# Patient Record
Sex: MALE | Race: BLACK OR AFRICAN AMERICAN
[De-identification: names, ages, dates, MRNs, and addresses within clinical notes are randomized per-mention and may not be internally consistent; named-entity substitution may affect disease eponyms.]

---

## 2020-10-04 VITALS
SYSTOLIC BLOOD PRESSURE: 136 MMHG | SYSTOLIC BLOOD PRESSURE: 136 MMHG | SYSTOLIC BLOOD PRESSURE: 136 MMHG | DIASTOLIC BLOOD PRESSURE: 73 MMHG | DIASTOLIC BLOOD PRESSURE: 73 MMHG | DIASTOLIC BLOOD PRESSURE: 73 MMHG

## 2021-12-08 ENCOUNTER — HOSPITAL ENCOUNTER (OUTPATIENT)
Dept: HOSPITAL 63 - LAB | Age: 76
End: 2021-12-08
Attending: UROLOGY
Payer: MEDICARE

## 2021-12-08 DIAGNOSIS — R30.0: Primary | ICD-10-CM

## 2021-12-08 LAB
APTT PPP: YELLOW S
BACTERIA #/AREA URNS HPF: (no result) /HPF
BILIRUB UR QL STRIP: (no result)
FIBRINOGEN PPP-MCNC: (no result) MG/DL
GLUCOSE UR STRIP-MCNC: (no result) MG/DL
NITRITE UR QL STRIP: (no result)
RBC #/AREA URNS HPF: (no result) /HPF (ref 0–2)
SP GR UR STRIP: >=1.03
SQUAMOUS #/AREA URNS LPF: (no result) /LPF
UROBILINOGEN UR-MCNC: 0.2 MG/DL
WBC #/AREA URNS HPF: >40 /HPF (ref 0–4)

## 2021-12-08 PROCEDURE — 87077 CULTURE AEROBIC IDENTIFY: CPT

## 2021-12-08 PROCEDURE — 87086 URINE CULTURE/COLONY COUNT: CPT

## 2021-12-08 PROCEDURE — 87186 SC STD MICRODIL/AGAR DIL: CPT

## 2021-12-08 PROCEDURE — 81001 URINALYSIS AUTO W/SCOPE: CPT

## 2022-01-28 ENCOUNTER — HOSPITAL ENCOUNTER (OUTPATIENT)
Dept: HOSPITAL 63 - CT | Age: 77
End: 2022-01-28
Attending: UROLOGY
Payer: MEDICARE

## 2022-01-28 DIAGNOSIS — J98.11: ICD-10-CM

## 2022-01-28 DIAGNOSIS — M43.16: ICD-10-CM

## 2022-01-28 DIAGNOSIS — N20.0: Primary | ICD-10-CM

## 2022-01-28 DIAGNOSIS — I25.10: ICD-10-CM

## 2022-01-28 DIAGNOSIS — N28.1: ICD-10-CM

## 2022-01-28 DIAGNOSIS — K44.9: ICD-10-CM

## 2022-01-28 DIAGNOSIS — N32.89: ICD-10-CM

## 2022-01-28 DIAGNOSIS — K75.3: ICD-10-CM

## 2022-01-28 DIAGNOSIS — I70.0: ICD-10-CM

## 2022-01-28 DIAGNOSIS — I51.7: ICD-10-CM

## 2022-01-28 DIAGNOSIS — M41.86: ICD-10-CM

## 2022-01-28 DIAGNOSIS — K76.89: ICD-10-CM

## 2022-01-28 DIAGNOSIS — Q25.46: ICD-10-CM

## 2022-01-28 DIAGNOSIS — E27.8: ICD-10-CM

## 2022-01-28 LAB
CA-I SERPL ISE-MCNC: 18 MG/DL (ref 8–26)
CREAT SERPL-MCNC: 1.1 MG/DL (ref 0.7–1.3)
GFR SERPLBLD BASED ON 1.73 SQ M-ARVRAT: 78.7 ML/MIN

## 2022-01-28 PROCEDURE — 82565 ASSAY OF CREATININE: CPT

## 2022-01-28 PROCEDURE — 36415 COLL VENOUS BLD VENIPUNCTURE: CPT

## 2022-01-28 PROCEDURE — 74178 CT ABD&PLV WO CNTR FLWD CNTR: CPT

## 2022-01-28 PROCEDURE — 84520 ASSAY OF UREA NITROGEN: CPT

## 2022-01-28 NOTE — RAD
EXAM: Abdomen and pelvis CT without and with intravenous contrast.



HISTORY: Hematuria.



TECHNIQUE: Computed tomographic images of the abdomen and pelvis were obtained without and with contr
ast. Multiplanar reformatting was performed.



*One or more of the following individualized dose reduction techniques were utilized for this examina
tion:  

1. Automated exposure control.  

2. Adjustment of the mA and/or kV according to patient size.  

3. Use of iterative reconstruction technique.



COMPARISON: 10/4/2020.



FINDINGS: Evaluation of the lower thorax demonstrates basilar atelectasis. There is no infiltrate or 
pleural effusion. There is cardiomegaly. There is calcification of the aortic valve. There is calcifi
ed atherosclerotic plaque involving the coronary arteries. There is a tortuous thoracic aorta. There 
is a tiny hiatal hernia.



There are a few liver granulomas. There are hepatic cysts, the largest of which measures 4.3 cm. Ther
e are few small hypodense lesions within the liver which are too small to characterize. These are als
o likely cystic. The gallbladder and pancreas are unremarkable. The spleen is upper normal in size. T
here is a 2.5 cm left adrenal nodule with macroscopic fat, likely an adrenal myelolipoma. There is al
so a 1.6 cm hypodense right adrenal lesion likely due to an adenoma.



There are a few nonobstructing renal stones measuring up to 1 mm. There is no evidence of a ureteral 
stone or hydronephrosis. There is renal cortical lobulation likely due to scarring. There are small s
imple appearing renal cysts, largest of which measures 2.6 cm within the upper pole of the left kidne
y. There is no convincing solid renal lesion.



There is no appendicitis. There is no bowel obstruction. There is no abnormal bowel wall thickening. 
There is slight asymmetric bladder wall thickening along the right posterior lateral bladder centered
 at the right ureterovesical junction. There is slight surrounding fatty stranding.



There is aortic and aortic branch vessel atherosclerosis. There is no aneurysm. No pathologically enl
arged lymph node is seen. There is increased rectal wall fat, a finding which can be seen as a sequel
a of prior inflammation. There is heterotopic ossification along the anterior right thigh soft tissue
s. The prostate is absent.



There is instrumented posterior spinal fusion and disc space fusion device placement at L4-L5. There 
is multilevel degenerative change throughout the spine. There is scoliosis and multilevel listhesis. 
There is no acute osseous finding.



IMPRESSION: 

1. Bladder wall thickening and slight surrounding inflammatory stranding. This appears to thickest wi
thin the right posterior lateral bladder in the region of the right uterovesical junction. Correlate 
with urinalysis and possible cystoscopy to exclude cystitis or bladder neoplasm. The previously descr
ibed adjacent prominent right pelvic lymph nodes are not considered specific is on the current exam.

2. Multiple simple appearing renal cysts. Follow-up is not routinely performed for simple cysts.

3. Multiple simple appearing hepatic cysts.

4. Stable 2.5 cm suspected left adrenal myelolipoma and 1.6 cm suspected right adrenal adenoma. This 
can be confirmed with MRI if there is clinical concern.

5. Punctate nonobstructing renal stones. There is no hydronephrosis.

6. Mild increased rectal wall mural fat, possibly due to the sequela prior inflammation.



Electronically signed by: Laura Guevara MD (1/28/2022 11:55 AM) [id number]

## 2022-04-25 ENCOUNTER — HOSPITAL ENCOUNTER (OUTPATIENT)
Dept: HOSPITAL 63 - LAB | Age: 77
End: 2022-04-25
Attending: UROLOGY
Payer: MEDICARE

## 2022-04-25 DIAGNOSIS — N39.0: Primary | ICD-10-CM

## 2022-04-25 LAB
APTT PPP: YELLOW S
BACTERIA #/AREA URNS HPF: (no result) /HPF
FIBRINOGEN PPP-MCNC: (no result) MG/DL
GLUCOSE UR STRIP-MCNC: (no result) MG/DL
NITRITE UR QL STRIP: (no result)
SP GR UR STRIP: >=1.03
SQUAMOUS #/AREA URNS LPF: (no result) /LPF
UROBILINOGEN UR-MCNC: 0.2 MG/DL

## 2022-04-25 PROCEDURE — 87086 URINE CULTURE/COLONY COUNT: CPT

## 2022-04-25 PROCEDURE — 81001 URINALYSIS AUTO W/SCOPE: CPT
